# Patient Record
Sex: MALE | URBAN - METROPOLITAN AREA
[De-identification: names, ages, dates, MRNs, and addresses within clinical notes are randomized per-mention and may not be internally consistent; named-entity substitution may affect disease eponyms.]

---

## 2018-12-03 ENCOUNTER — OFFICE VISIT (OUTPATIENT)
Dept: URBAN - METROPOLITAN AREA CLINIC 23 | Facility: CLINIC | Age: 68
End: 2018-12-03
Payer: MEDICARE

## 2018-12-03 DIAGNOSIS — H33.032: Primary | ICD-10-CM

## 2018-12-03 PROCEDURE — 92014 COMPRE OPH EXAM EST PT 1/>: CPT | Performed by: OPTOMETRIST

## 2018-12-03 ASSESSMENT — KERATOMETRY
OD: 37.63
OS: 39.13

## 2018-12-03 ASSESSMENT — VISUAL ACUITY
OD: 20/40
OS: CF 1FT

## 2018-12-03 ASSESSMENT — INTRAOCULAR PRESSURE
OD: 19
OS: 20

## 2018-12-03 NOTE — IMPRESSION/PLAN
Impression: Retinal detachment with giant retinal tear of lt eye: H33.032. Plan: Mac off. Recommend see retina specialist within 1-2 days for sx. Pt has had condition for 1 week. Pt advised will not get 20/20 vision out of eye.

## 2018-12-03 NOTE — IMPRESSION/PLAN
Impression: Other secondary cataract of right eye: H26.491. Plan: Cloudy. Return after retina sx for YAG.

## 2018-12-04 ENCOUNTER — OFFICE VISIT (OUTPATIENT)
Dept: URBAN - METROPOLITAN AREA CLINIC 17 | Facility: CLINIC | Age: 68
End: 2018-12-04
Payer: MEDICARE

## 2018-12-04 DIAGNOSIS — H33.052 TOTAL RETINAL DETACHMENT, LEFT EYE: Primary | ICD-10-CM

## 2018-12-04 PROCEDURE — 92014 COMPRE OPH EXAM EST PT 1/>: CPT | Performed by: OPHTHALMOLOGY

## 2018-12-04 PROCEDURE — 92134 CPTRZ OPH DX IMG PST SGM RTA: CPT | Performed by: OPHTHALMOLOGY

## 2018-12-04 RX ORDER — PREDNISOLONE ACETATE 10 MG/ML
1 % SUSPENSION/ DROPS OPHTHALMIC
Qty: 10 | Refills: 5 | Status: INACTIVE
Start: 2018-12-04 | End: 2018-12-13

## 2018-12-04 RX ORDER — OFLOXACIN 3 MG/ML
0.3 % SOLUTION/ DROPS OPHTHALMIC
Qty: 5 | Refills: 3 | Status: INACTIVE
Start: 2018-12-04 | End: 2019-02-22

## 2018-12-04 ASSESSMENT — INTRAOCULAR PRESSURE
OD: 20
OS: 14

## 2018-12-04 NOTE — IMPRESSION/PLAN
Impression: Total retinal detachment, left eye: H33.052. OS. Condition: unstable. Vision: vision affected. Plan: Discussed diagnosis in detail with patient. Discussed risks of progression. Surgical treatment is recommended to repair the retina PPVx. Patient asked about vision post surgery. Advised patient nothing is guarenteed. Explained that once the center is detached, it is less likely to recover vision. Pt understood. Surgical risks and benefits were discussed, explained and understood by patient. Unable to tell how much vision will be recovered. Discussed gas bubble and post-op care: no traveling, flying or high altitude for approximately 6 - 8 weeks. All questions answered. Patient elects to proceed with recommendation. RL1. Educational material provided to patient. Drops Erx to pharmacy on file. OCT stable OD, poor scan OS. 
Schedule URGENT Surgery

## 2018-12-05 ENCOUNTER — SURGERY (OUTPATIENT)
Dept: URBAN - METROPOLITAN AREA SURGERY 15 | Facility: SURGERY | Age: 68
End: 2018-12-05
Payer: MEDICARE

## 2018-12-05 PROCEDURE — 67108 REPAIR DETACHED RETINA: CPT | Performed by: OPHTHALMOLOGY

## 2018-12-06 ENCOUNTER — POST-OPERATIVE VISIT (OUTPATIENT)
Dept: URBAN - METROPOLITAN AREA CLINIC 17 | Facility: CLINIC | Age: 68
End: 2018-12-06

## 2018-12-06 DIAGNOSIS — Z09 ENCNTR FOR F/U EXAM AFT TRTMT FOR COND OTH THAN MALIG NEOPLM: Primary | ICD-10-CM

## 2018-12-06 RX ORDER — BRIMONIDINE TARTRATE, TIMOLOL MALEATE 2; 5 MG/ML; MG/ML
SOLUTION/ DROPS OPHTHALMIC
Qty: 0 | Refills: 0 | Status: INACTIVE
Start: 2018-12-06 | End: 2019-02-22

## 2018-12-06 ASSESSMENT — INTRAOCULAR PRESSURE
OD: 19
OS: 30

## 2018-12-13 ENCOUNTER — POST-OPERATIVE VISIT (OUTPATIENT)
Dept: URBAN - METROPOLITAN AREA CLINIC 17 | Facility: CLINIC | Age: 68
End: 2018-12-13

## 2018-12-13 RX ORDER — LOTEPREDNOL ETABONATE 5 MG/ML
0.5 % SUSPENSION/ DROPS OPHTHALMIC
Qty: 1 | Refills: 0 | Status: INACTIVE
Start: 2018-12-13 | End: 2019-03-11

## 2018-12-13 ASSESSMENT — INTRAOCULAR PRESSURE
OD: 20
OS: 35
OD: 22

## 2018-12-20 ENCOUNTER — POST-OPERATIVE VISIT (OUTPATIENT)
Dept: URBAN - METROPOLITAN AREA CLINIC 17 | Facility: CLINIC | Age: 68
End: 2018-12-20

## 2018-12-20 PROCEDURE — 99024 POSTOP FOLLOW-UP VISIT: CPT | Performed by: OPHTHALMOLOGY

## 2018-12-20 RX ORDER — DORZOLAMIDE HYDROCHLORIDE 20 MG/ML
2 % SOLUTION OPHTHALMIC
Qty: 10 | Refills: 5 | Status: INACTIVE
Start: 2018-12-20 | End: 2019-03-11

## 2018-12-20 ASSESSMENT — INTRAOCULAR PRESSURE
OS: 32
OD: 19
OS: 32
OD: 19

## 2018-12-27 ENCOUNTER — POST-OPERATIVE VISIT (OUTPATIENT)
Dept: URBAN - METROPOLITAN AREA CLINIC 17 | Facility: CLINIC | Age: 68
End: 2018-12-27

## 2018-12-27 PROCEDURE — 99024 POSTOP FOLLOW-UP VISIT: CPT | Performed by: OPTOMETRIST

## 2018-12-27 ASSESSMENT — INTRAOCULAR PRESSURE
OD: 19
OS: 20

## 2019-01-15 ENCOUNTER — POST-OPERATIVE VISIT (OUTPATIENT)
Dept: URBAN - METROPOLITAN AREA CLINIC 23 | Facility: CLINIC | Age: 69
End: 2019-01-15

## 2019-01-15 PROCEDURE — 99024 POSTOP FOLLOW-UP VISIT: CPT | Performed by: OPHTHALMOLOGY

## 2019-01-15 ASSESSMENT — INTRAOCULAR PRESSURE
OS: 14
OD: 17

## 2019-02-22 ENCOUNTER — POST-OPERATIVE VISIT (OUTPATIENT)
Dept: URBAN - METROPOLITAN AREA CLINIC 23 | Facility: CLINIC | Age: 69
End: 2019-02-22

## 2019-02-22 PROCEDURE — 99024 POSTOP FOLLOW-UP VISIT: CPT | Performed by: OPHTHALMOLOGY

## 2019-02-22 RX ORDER — BRIMONIDINE TARTRATE, TIMOLOL MALEATE 2; 5 MG/ML; MG/ML
SOLUTION/ DROPS OPHTHALMIC
Qty: 5 | Refills: 5 | Status: INACTIVE
Start: 2019-02-22 | End: 2019-07-11

## 2019-02-22 ASSESSMENT — INTRAOCULAR PRESSURE
OS: 28
OD: 15

## 2019-03-11 ENCOUNTER — POST-OPERATIVE VISIT (OUTPATIENT)
Dept: URBAN - METROPOLITAN AREA CLINIC 23 | Facility: CLINIC | Age: 69
End: 2019-03-11

## 2019-03-11 RX ORDER — BRIMONIDINE TARTRATE 2 MG/ML
0.2 % SOLUTION/ DROPS OPHTHALMIC
Qty: 1 | Refills: 6 | Status: ACTIVE
Start: 2019-03-11

## 2019-03-11 RX ORDER — TIMOLOL MALEATE 5 MG/ML
0.5 % SOLUTION/ DROPS OPHTHALMIC
Qty: 1 | Refills: 0 | Status: INACTIVE
Start: 2019-03-11 | End: 2019-08-19

## 2019-03-11 ASSESSMENT — INTRAOCULAR PRESSURE
OS: 22
OD: 15

## 2019-04-03 ENCOUNTER — OFFICE VISIT (OUTPATIENT)
Dept: URBAN - METROPOLITAN AREA CLINIC 23 | Facility: CLINIC | Age: 69
End: 2019-04-03
Payer: MEDICARE

## 2019-04-03 PROCEDURE — 99213 OFFICE O/P EST LOW 20 MIN: CPT | Performed by: OPHTHALMOLOGY

## 2019-04-03 PROCEDURE — 92134 CPTRZ OPH DX IMG PST SGM RTA: CPT | Performed by: OPHTHALMOLOGY

## 2019-04-03 ASSESSMENT — INTRAOCULAR PRESSURE
OD: 18
OS: 10

## 2019-04-03 NOTE — IMPRESSION/PLAN
Impression: Total retinal detachment, left eye: H33.052. OS. Condition: resolved s/p PPvx. Vision: vision improved
s/p 25 G PPVX EL/IL ADX [id number] NAHUM OS 12/5/2018 Plan: Advised patient of condition. Discussed diagnosis in detail with patient. No treatment is required at this time. Will continue to observe condition and or symptoms. Call if [address] worsens. OCT shows retina attached OS Recommend patient stop using Combigan and come back for 6 wk IOP check with Optometrist
Exam and OCT of the right eye are stable.  No treatment needed

## 2019-05-13 ENCOUNTER — OFFICE VISIT (OUTPATIENT)
Dept: URBAN - METROPOLITAN AREA CLINIC 23 | Facility: CLINIC | Age: 69
End: 2019-05-13
Payer: MEDICARE

## 2019-05-13 PROCEDURE — 99213 OFFICE O/P EST LOW 20 MIN: CPT | Performed by: OPTOMETRIST

## 2019-05-13 ASSESSMENT — INTRAOCULAR PRESSURE
OD: 20
OD: 16
OS: 20
OS: 22

## 2019-07-11 ENCOUNTER — OFFICE VISIT (OUTPATIENT)
Dept: URBAN - METROPOLITAN AREA CLINIC 23 | Facility: CLINIC | Age: 69
End: 2019-07-11
Payer: MEDICARE

## 2019-07-11 DIAGNOSIS — H40.1131 PRIMARY OPEN-ANGLE GLAUCOMA, BILATERAL, MILD STAGE: ICD-10-CM

## 2019-07-11 DIAGNOSIS — H17.89 OTHER CORNEAL SCARS AND OPACITIES: Primary | ICD-10-CM

## 2019-07-11 DIAGNOSIS — H26.491 OTHER SECONDARY CATARACT OF RIGHT EYE: ICD-10-CM

## 2019-07-11 PROCEDURE — 92071 CONTACT LENS FITTING FOR TX: CPT | Performed by: OPTOMETRIST

## 2019-07-11 PROCEDURE — 99212 OFFICE O/P EST SF 10 MIN: CPT | Performed by: OPTOMETRIST

## 2019-07-11 RX ORDER — PREDNISOLONE ACETATE 10 MG/ML
1 % SUSPENSION/ DROPS OPHTHALMIC
Qty: 1 | Refills: 1 | Status: ACTIVE
Start: 2019-07-11

## 2019-07-11 RX ORDER — OFLOXACIN 3 MG/ML
0.3 % SOLUTION/ DROPS OPHTHALMIC
Qty: 1 | Refills: 0 | Status: INACTIVE
Start: 2019-07-11 | End: 2019-07-17

## 2019-07-11 ASSESSMENT — INTRAOCULAR PRESSURE: OS: 24

## 2019-07-11 NOTE — IMPRESSION/PLAN
Impression: Primary iridocyclitis, left eye: H20.012. Plan: Discussed inflammation.  Rx prednisolone OS

## 2019-07-11 NOTE — IMPRESSION/PLAN
Impression: Other secondary cataract of right eye: H26.491. Plan: Discussed mild PCO OD, no YAG needed.

## 2019-07-11 NOTE — IMPRESSION/PLAN
Impression: Primary open-angle glaucoma, bilateral, mild stage: H40.1131 OU. Plan: Continue glaucoma drops. Discussed IOP is elevated. Recommend consult for ALT when corneal problem has resolved.

## 2019-07-11 NOTE — IMPRESSION/PLAN
Impression: Other corneal scars and opacities: H17.89. Plan: wound gap inf AK scar. recommend BCL overnight. Rx antibiotic to prevent infection and steroid for inflammation.

## 2019-07-12 ENCOUNTER — OFFICE VISIT (OUTPATIENT)
Dept: URBAN - METROPOLITAN AREA CLINIC 23 | Facility: CLINIC | Age: 69
End: 2019-07-12
Payer: MEDICARE

## 2019-07-12 DIAGNOSIS — H20.012 PRIMARY IRIDOCYCLITIS, LEFT EYE: Primary | ICD-10-CM

## 2019-07-12 PROCEDURE — 99212 OFFICE O/P EST SF 10 MIN: CPT | Performed by: OPTOMETRIST

## 2019-07-12 NOTE — IMPRESSION/PLAN
Impression: Primary iridocyclitis, left eye: H20.012. Plan: Discussed findings. Condition improving. BCL removed. Pt to continue ofloxacin QID x 1 week. Taper prednisolone QID x 5 days, TID x 5 days, BID x 5 days, QD x 5 days, then d/c. Use AT PRN. Continue glc drops as prescribed. Monitor. Pt to return if condition worsens or needs another BCL.

## 2019-08-12 ENCOUNTER — OFFICE VISIT (OUTPATIENT)
Dept: URBAN - METROPOLITAN AREA CLINIC 23 | Facility: CLINIC | Age: 69
End: 2019-08-12
Payer: MEDICARE

## 2019-08-12 PROCEDURE — 99213 OFFICE O/P EST LOW 20 MIN: CPT | Performed by: OPTOMETRIST

## 2019-08-12 RX ORDER — PREDNISOLONE ACETATE 10 MG/ML
1 % SUSPENSION/ DROPS OPHTHALMIC
Qty: 10 | Refills: 1 | Status: INACTIVE
Start: 2019-08-12 | End: 2019-09-05

## 2019-08-12 ASSESSMENT — INTRAOCULAR PRESSURE
OD: 21
OS: 22

## 2019-08-12 NOTE — IMPRESSION/PLAN
Impression: Primary iridocyclitis, left eye: H20.012. Plan: Discussed findings. Recommend pt get tested for inflammatory disease. Pt to use prednisolone every 2 hrs x 2 days. Pt to then taper QID x 1 week, TID x 1 week, BID x 1 week, QD x 1 week, then d/c.

## 2019-08-19 ENCOUNTER — OFFICE VISIT (OUTPATIENT)
Dept: URBAN - METROPOLITAN AREA CLINIC 23 | Facility: CLINIC | Age: 69
End: 2019-08-19
Payer: MEDICARE

## 2019-08-19 PROCEDURE — 99213 OFFICE O/P EST LOW 20 MIN: CPT | Performed by: OPTOMETRIST

## 2019-08-19 RX ORDER — TIMOLOL MALEATE 5 MG/ML
0.5 % SOLUTION/ DROPS OPHTHALMIC
Qty: 15 | Refills: 3 | Status: ACTIVE
Start: 2019-08-19

## 2019-08-19 ASSESSMENT — INTRAOCULAR PRESSURE
OD: 17
OS: 17

## 2019-08-19 NOTE — IMPRESSION/PLAN
Impression: Primary iridocyclitis, left eye: H20.012. Plan: Discussed findings. Recommend continue prednisolone. Taper starting tomorrow TID x 1 week, BID x 1 week, QD x 1 week. Pt to f/u with Dr. Nadine Alston.

## 2019-08-19 NOTE — IMPRESSION/PLAN
Impression: Other secondary cataract of right eye: H26.491. Plan: Discussed diagnosis with patient. Recommend YAG PCO eval OD with cataract surgeon next available.

## 2019-09-05 ENCOUNTER — OFFICE VISIT (OUTPATIENT)
Dept: URBAN - METROPOLITAN AREA CLINIC 23 | Facility: CLINIC | Age: 69
End: 2019-09-05
Payer: MEDICARE

## 2019-09-05 DIAGNOSIS — H10.021 OTHER MUCOPURULENT CONJUNCTIVITIS, RIGHT EYE: Primary | ICD-10-CM

## 2019-09-05 PROCEDURE — 99213 OFFICE O/P EST LOW 20 MIN: CPT | Performed by: OPTOMETRIST

## 2019-09-05 ASSESSMENT — INTRAOCULAR PRESSURE
OS: 20
OD: 16

## 2020-10-15 ENCOUNTER — OFFICE VISIT (OUTPATIENT)
Dept: URBAN - METROPOLITAN AREA CLINIC 41 | Facility: CLINIC | Age: 70
End: 2020-10-15
Payer: MEDICARE

## 2020-10-15 DIAGNOSIS — H33.021 RETINAL DETACHMENT WITH MULTIPLE BREAKS, RIGHT EYE: Primary | ICD-10-CM

## 2020-10-15 PROCEDURE — 99213 OFFICE O/P EST LOW 20 MIN: CPT | Performed by: OPHTHALMOLOGY

## 2020-10-15 PROCEDURE — 92134 CPTRZ OPH DX IMG PST SGM RTA: CPT | Performed by: OPHTHALMOLOGY

## 2020-10-15 ASSESSMENT — INTRAOCULAR PRESSURE
OD: 6
OS: 21

## 2020-10-15 NOTE — IMPRESSION/PLAN
Impression: S/P PPV, MP, SO removal, AFX x ERM OD - 83 Days. Retinal detachment with multiple breaks, right eye  H33.021.
s/p Triesence 8/25/2020 Plan: Retina remains fully attached. NO s/s of infection. IOP remains low but AC formed and no choroidal effusion. OCT shows persistent, severe CME OD s/p Triesence. No further treatment recommended. Goal is comfort and preservation of existing vision. Pt understands and accepts.    

RTC 3-4 months for OCT OU